# Patient Record
Sex: MALE | Race: WHITE | URBAN - METROPOLITAN AREA
[De-identification: names, ages, dates, MRNs, and addresses within clinical notes are randomized per-mention and may not be internally consistent; named-entity substitution may affect disease eponyms.]

---

## 2017-09-12 ENCOUNTER — OFFICE VISIT (OUTPATIENT)
Dept: URGENT CARE | Facility: URGENT CARE | Age: 37
End: 2017-09-12
Payer: COMMERCIAL

## 2017-09-12 VITALS
HEART RATE: 99 BPM | OXYGEN SATURATION: 99 % | SYSTOLIC BLOOD PRESSURE: 137 MMHG | DIASTOLIC BLOOD PRESSURE: 81 MMHG | HEIGHT: 70 IN | TEMPERATURE: 100.1 F | WEIGHT: 196 LBS | BODY MASS INDEX: 28.06 KG/M2

## 2017-09-12 DIAGNOSIS — R30.0 DYSURIA: Primary | ICD-10-CM

## 2017-09-12 DIAGNOSIS — R50.9 FEVER, UNSPECIFIED: ICD-10-CM

## 2017-09-12 LAB

## 2017-09-12 PROCEDURE — 87086 URINE CULTURE/COLONY COUNT: CPT | Performed by: PHYSICIAN ASSISTANT

## 2017-09-12 PROCEDURE — 99213 OFFICE O/P EST LOW 20 MIN: CPT | Performed by: PHYSICIAN ASSISTANT

## 2017-09-12 PROCEDURE — 81001 URINALYSIS AUTO W/SCOPE: CPT | Performed by: FAMILY MEDICINE

## 2017-09-12 RX ORDER — CIPROFLOXACIN 500 MG/1
500 TABLET, FILM COATED ORAL 2 TIMES DAILY
Qty: 20 TABLET | Refills: 0 | Status: SHIPPED | OUTPATIENT
Start: 2017-09-12

## 2017-09-12 NOTE — PROGRESS NOTES
HPI:  Rodolfo is a 38 yo male who presents for dysuria x today.  Started to feel achy and chills yesterday and then this morning started to have urinary frequency with burning.  Denies blood in urine.  Current temperature is 100F.  Denies flank pain, abdominal pain or discharge from penis.    He reports no concerns for STD's.  He is  and states he is monogamous.  He also reports has not had sexual activity for quite a few months due to busy schedules.    Denies cold sx, cough, diarrhea or any other health concerns.     Has had a hx of UTI's.    ROS:  See HPI      PE:  Vitals & nursing notes reviewed.  B/P: 137/81, T: 100.1, P: 99, R: Data Unavailable  Constitutional:  Alert, well nourished, well-developed, NAD  Head:  Atraumatic, normocephalic  Eyes:  Perrla, EOMI, conjunctiva:  Pink   Sclera:  Anicteric  Ears:  Canals clear BL, TM pearly BL  Throat:  No erythema, exudates, or edema to postoropharynx  Neck:  Supple, no cervical LAD  Lungs:  CTA, no wheezes, rhonchi, or rales  CV:  RRR,  no murmur appreciated  No CVA tenderness BL      ASSESSMENT:  1. Dysuria  2. Fever  Comment:  UA negative for nitrites.  UA macro indicates WB 2-5.  Pt has fever and hx of UTI's.  No cva tenderness but will extend to 10 days of tx with cipro.  DDX:  Pyelonephritis, prostatitis .   Plan:  Cipro 500mg BID x 10 days.  Urine culture pending.    Hydration.  Monitor sx.  NSAIDS or tylenol for pain or fever.   F/U with PCP if sx persist or worsen.

## 2017-09-12 NOTE — NURSING NOTE
"Chief Complaint   Patient presents with     Urgent Care     UTI     c/o dysuria for 1 day       Initial /81  Pulse 99  Temp 100.1  F (37.8  C) (Tympanic)  Ht 5' 10\" (1.778 m)  Wt 196 lb (88.9 kg)  SpO2 99%  BMI 28.12 kg/m2 Estimated body mass index is 28.12 kg/(m^2) as calculated from the following:    Height as of this encounter: 5' 10\" (1.778 m).    Weight as of this encounter: 196 lb (88.9 kg).  Medication Reconciliation: complete   Jacklyn Lang MA    "

## 2017-09-12 NOTE — MR AVS SNAPSHOT
"              After Visit Summary   9/12/2017    Rodolfo Macias    MRN: [id number]           Patient Information     Date Of Birth          1980        Visit Information        Provider Department      9/12/2017 5:15 PM Crystal Aviles PA-C [facility]        Today's Diagnoses     Dysuria    -  1    Fever, unspecified           Follow-ups after your visit        Who to contact     If you have questions or need follow up information about today's clinic visit or your schedule please contact [facility] directly at [phone].  Normal or non-critical lab and imaging results will be communicated to you by [employer]hart, letter or phone within 4 business days after the clinic has received the results. If you do not hear from us within 7 days, please contact the clinic through [employer] or phone. If you have a critical or abnormal lab result, we will notify you by phone as soon as possible.  Submit refill requests through [employer] or call your pharmacy and they will forward the refill request to us. Please allow 3 business days for your refill to be completed.          Additional Information About Your Visit        MyChart Information     [employer] gives you secure access to your electronic health record. If you see a primary care provider, you can also send messages to your care team and make appointments. If you have questions, please call your primary care clinic.  If you do not have a primary care provider, please call [phone] and they will assist you.        Care EveryWhere ID     This is your Care EveryWhere ID. This could be used by other organizations to access your [facility] medical records  [id number]        Your Vitals Were     Pulse Temperature Height Pulse Oximetry BMI (Body Mass Index)       99 100.1  F (37.8  C) (Tympanic) 5' 10\" (1.778 m) 99% 28.12 kg/m2        Blood Pressure from Last 3 Encounters:   09/12/17 137/81   03/08/16 136/66   12/17/13 " 114/82    Weight from Last 3 Encounters:   09/12/17 196 lb (88.9 kg)   03/08/16 199 lb (90.3 kg)   12/17/13 201 lb (91.2 kg)              We Performed the Following     *UA reflex to Microscopic and Culture ([facility] (except [place] and [place])     Urine Culture Aerobic Bacterial     Urine Microscopic          Today's Medication Changes          These changes are accurate as of: 9/12/17  6:26 PM.  If you have any questions, ask your nurse or doctor.               Start taking these medicines.        Dose/Directions    ciprofloxacin 500 MG tablet   Commonly known as:  CIPRO   Used for:  Dysuria, Fever, unspecified   Started by:  Crystal Aviles PA-C        Dose:  500 mg   Take 1 tablet (500 mg) by mouth 2 times daily   Quantity:  20 tablet   Refills:  0         Stop taking these medicines if you haven't already. Please contact your care team if you have questions.     fexofenadine 180 MG tablet   Commonly known as:  ALLEGRA   Stopped by:  Crystal Aviles PA-C           sildenafil 100 MG cap/tab   Commonly known as:  VIAGRA   Stopped by:  Crystal Aviles PA-C                Where to get your medicines      These medications were sent to [facility] - [place], [place] - [address]  [address], [place] [address]     Phone:  [phone]     ciprofloxacin 500 MG tablet                Primary Care Provider Office Phone # Fax #    Murali Daniel Irwin Wegener, [phone]       [facility] [address] [place] [address]        Equal Access to Services     DELORES MENDEZ : Hadii aad ku hadasho Soomaali, waaxda luqadaha, qaybta kaalmada adeegyada, chepe alcantar. So [facility] [phone].    ATENCIÓN: Si habla español, tiene a lovell disposición servicios gratuitos de asistencia lingüística. Llamena al [phone].    We comply with applicable federal civil rights laws and [place] laws. We do not discriminate on the basis of race, color,  national origin, age, disability sex, sexual orientation or gender identity.            Thank you!     Thank you for choosing [facility]  for your care. Our goal is always to provide you with excellent care. Hearing back from our patients is one way we can continue to improve our services. Please take a few minutes to complete the written survey that you may receive in the mail after your visit with us. Thank you!             Your Updated Medication List - Protect others around you: Learn how to safely use, store and throw away your medicines at www.disposemymeds.org.          This list is accurate as of: 9/12/17  6:26 PM.  Always use your most recent med list.                   Brand Name Dispense Instructions for use Diagnosis    albuterol 108 (90 BASE) MCG/ACT Inhaler    PROAIR HFA    1 Inhaler    Inhale 1-2 puffs into the lungs 4 times daily as needed    Mild intermittent asthma without complication       ciprofloxacin 500 MG tablet    CIPRO    20 tablet    Take 1 tablet (500 mg) by mouth 2 times daily    Dysuria, Fever, unspecified

## 2017-09-13 LAB
BACTERIA SPEC CULT: NORMAL
BACTERIA SPEC CULT: NORMAL
SPECIMEN SOURCE: NORMAL

## 2019-11-06 ENCOUNTER — HEALTH MAINTENANCE LETTER (OUTPATIENT)
Age: 39
End: 2019-11-06

## 2020-11-29 ENCOUNTER — HEALTH MAINTENANCE LETTER (OUTPATIENT)
Age: 40
End: 2020-11-29

## 2021-09-19 ENCOUNTER — HEALTH MAINTENANCE LETTER (OUTPATIENT)
Age: 41
End: 2021-09-19

## 2022-01-09 ENCOUNTER — HEALTH MAINTENANCE LETTER (OUTPATIENT)
Age: 42
End: 2022-01-09

## 2022-11-21 ENCOUNTER — HEALTH MAINTENANCE LETTER (OUTPATIENT)
Age: 42
End: 2022-11-21

## 2023-04-16 ENCOUNTER — HEALTH MAINTENANCE LETTER (OUTPATIENT)
Age: 43
End: 2023-04-16